# Patient Record
Sex: FEMALE | NOT HISPANIC OR LATINO | URBAN - METROPOLITAN AREA
[De-identification: names, ages, dates, MRNs, and addresses within clinical notes are randomized per-mention and may not be internally consistent; named-entity substitution may affect disease eponyms.]

---

## 2018-06-06 ENCOUNTER — APPOINTMENT (RX ONLY)
Dept: URBAN - METROPOLITAN AREA CLINIC 122 | Facility: CLINIC | Age: 28
Setting detail: DERMATOLOGY
End: 2018-06-06

## 2018-06-06 DIAGNOSIS — L92.0 GRANULOMA ANNULARE: ICD-10-CM

## 2018-06-06 DIAGNOSIS — D49.2 NEOPLASM OF UNSPECIFIED BEHAVIOR OF BONE, SOFT TISSUE, AND SKIN: ICD-10-CM

## 2018-06-06 DIAGNOSIS — Q82.5 CONGENITAL NON-NEOPLASTIC NEVUS: ICD-10-CM

## 2018-06-06 DIAGNOSIS — L30.9 DERMATITIS, UNSPECIFIED: ICD-10-CM

## 2018-06-06 DIAGNOSIS — L29.8 OTHER PRURITUS: ICD-10-CM

## 2018-06-06 DIAGNOSIS — L29.89 OTHER PRURITUS: ICD-10-CM

## 2018-06-06 PROCEDURE — ? SHAVE REMOVAL

## 2018-06-06 PROCEDURE — ? COUNSELING

## 2018-06-06 PROCEDURE — 11100: CPT | Mod: 59

## 2018-06-06 PROCEDURE — ? PRESCRIPTION

## 2018-06-06 PROCEDURE — 99203 OFFICE O/P NEW LOW 30 MIN: CPT | Mod: 25

## 2018-06-06 PROCEDURE — ? BIOPSY BY PUNCH METHOD

## 2018-06-06 PROCEDURE — 11310 SHAVE SKIN LESION 0.5 CM/<: CPT

## 2018-06-06 RX ORDER — PREDNISONE 10 MG/1
TABLET ORAL AS DIRECTED
Qty: 30 | Refills: 0 | Status: ERX | COMMUNITY
Start: 2018-06-06

## 2018-06-06 RX ADMIN — PREDNISONE: 10 TABLET ORAL at 00:00

## 2018-06-06 ASSESSMENT — LOCATION ZONE DERM
LOCATION ZONE: TRUNK
LOCATION ZONE: FACE

## 2018-06-06 ASSESSMENT — LOCATION DETAILED DESCRIPTION DERM
LOCATION DETAILED: RIGHT RIB CAGE
LOCATION DETAILED: RIGHT INFERIOR MEDIAL MALAR CHEEK

## 2018-06-06 ASSESSMENT — LOCATION SIMPLE DESCRIPTION DERM
LOCATION SIMPLE: ABDOMEN
LOCATION SIMPLE: RIGHT CHEEK

## 2018-06-06 NOTE — PROCEDURE: SHAVE REMOVAL
Hemostasis: Monsel's and Electrocautery
Was A Bandage Applied: Yes
Medical Necessity Clause: This procedure was medically necessary,advised could regrow
Lab: [id number]
Medical Necessity Information: It is in your best interest to select a reason for this procedure from the list below. All of these items fulfill various CMS LCD requirements except the new and changing color options.
Render Post-Care Instructions In Note?: no
Post-Care Instructions: I reviewed with the patient in detail post-care instructions. Patient is to keep the biopsy site dry overnight, and then apply bacitracin twice daily until healed. Patient may apply hydrogen peroxide soaks to remove any crusting.ADVISED AREA ON CHEST PRONE TO SCARRING
Size Of Lesion In Cm (Required): 0.1
Wound Care: Polysporin ointment
Anesthesia Type: 1% lidocaine without epinephrine and a 1:10 solution of 8.4% sodium bicarbonate
Biopsy Method: scissors
Lab Facility: [id number]
Detail Level: Detailed
Anesthesia Volume In Cc: 1.3
Path Notes (To The Dermatopathologist): Fibrous papule?
Billing Type: Third-Party Bill
Consent was obtained from the patient. The risks and benefits to therapy were discussed in detail. Specifically, the risks of infection, scarring, bleeding, prolonged wound healing, incomplete removal, allergy to anesthesia, nerve injury and recurrence were addressed. Prior to the procedure, the treatment site was clearly identified and confirmed by the patient. All components of Universal Protocol/PAUSE Rule completed.
Anticipated Plan (Based On Presumed Biopsy Results): BIOPSY : ANGIOFIBROMA
X Size Of Lesion In Cm (Optional): 0
Notification Instructions: Patient will be notified of biopsy results. However, patient instructed to call the office if not contacted within 2 weeks.

## 2018-06-06 NOTE — HPI: RASH
How Severe Is Your Rash?: moderate
Is This A New Presentation, Or A Follow-Up?: Rash
Additional History: Patient states that she was treated by PCP and has no response with lamisil  250mg and lotrisone
Additional History: Patient states that rash has been there since she was younger, she states that she seen us and we treated her with a light.

## 2018-06-06 NOTE — PROCEDURE: BIOPSY BY PUNCH METHOD
Detail Level: Detailed
Patient Will Remove Sutures At Home?: No
Size Of Lesion In Cm (Optional): 0
Dressing: bandage
Wound Care: Polysporin ointment
Punch Size In Mm: 3
Suture Removal: 14 days
Notification Instructions: Patient will be notified of biopsy results. However, patient instructed to call the office if not contacted within 2 weeks.
Anticipated Plan (Based On Presumed Biopsy Results): BIOPSY : GRANULOMA ANNULARE
Anesthesia Volume In Cc (Will Not Render If 0): 1
Epidermal Sutures: 4-0 Ethilon
Anesthesia Type: 1% lidocaine with epinephrine
Lab: [id number]
Was A Bandage Applied: Yes
Consent: Written consent was obtained and risks were reviewed including but not limited to scarring, infection, bleeding, scabbing, incomplete removal, nerve damage and allergy to anesthesia.
Post-Care Instructions: I reviewed with the patient in detail post-care instructions. Patient is to keep the biopsy site dry overnight, and then apply bacitracin twice daily until healed. Patient may apply hydrogen peroxide soaks to remove any crusting.
Path Notes (To The Dermatopathologist): Granuloma annulare vs ?
Billing Type: Third-Party Bill
Biopsy Type: H and E
Home Suture Removal Text: Patient was provided a home suture removal kit and will remove their sutures at home.  If they have any questions or difficulties they will call the office.
Lab Facility: [id number]
Hemostasis: None

## 2018-06-20 ENCOUNTER — APPOINTMENT (RX ONLY)
Dept: URBAN - METROPOLITAN AREA CLINIC 122 | Facility: CLINIC | Age: 28
Setting detail: DERMATOLOGY
End: 2018-06-20

## 2018-06-20 DIAGNOSIS — L92.0 GRANULOMA ANNULARE: ICD-10-CM

## 2018-06-20 PROCEDURE — ? PRESCRIPTION

## 2018-06-20 PROCEDURE — 99213 OFFICE O/P EST LOW 20 MIN: CPT

## 2018-06-20 RX ORDER — HALOBETASOL PROPIONATE 0.5 MG/G
SMALL LOTION TOPICAL BID
Qty: 60 | Refills: 3 | Status: ERX | COMMUNITY
Start: 2018-06-20

## 2018-06-20 RX ADMIN — HALOBETASOL PROPIONATE SMALL: 0.5 LOTION TOPICAL at 00:00

## 2019-04-08 ENCOUNTER — APPOINTMENT (RX ONLY)
Dept: URBAN - METROPOLITAN AREA CLINIC 122 | Facility: CLINIC | Age: 29
Setting detail: DERMATOLOGY
End: 2019-04-08

## 2019-04-08 DIAGNOSIS — L92.0 GRANULOMA ANNULARE: ICD-10-CM

## 2019-04-08 PROCEDURE — ? PRESCRIPTION

## 2019-04-08 PROCEDURE — 99213 OFFICE O/P EST LOW 20 MIN: CPT

## 2019-04-08 PROCEDURE — ? TREATMENT REGIMEN

## 2019-04-08 RX ORDER — PREDNISONE 10 MG/1
ONE TABLET ORAL DAILY
Qty: 63 | Refills: 0 | Status: ERX | COMMUNITY
Start: 2019-04-08

## 2019-04-08 RX ADMIN — PREDNISONE ONE: 10 TABLET ORAL at 20:53

## 2019-04-08 NOTE — PROCEDURE: TREATMENT REGIMEN
Detail Level: Zone
Continue Regimen: Ultravate lotion
Plan: Discussed multiple areas harder to treat